# Patient Record
Sex: MALE | Race: WHITE | NOT HISPANIC OR LATINO
[De-identification: names, ages, dates, MRNs, and addresses within clinical notes are randomized per-mention and may not be internally consistent; named-entity substitution may affect disease eponyms.]

---

## 2019-03-07 PROBLEM — Z00.00 ENCOUNTER FOR PREVENTIVE HEALTH EXAMINATION: Status: ACTIVE | Noted: 2019-03-07

## 2020-02-25 ENCOUNTER — RX RENEWAL (OUTPATIENT)
Age: 50
End: 2020-02-25

## 2020-02-25 RX ORDER — BUDESONIDE 0.5 MG/2ML
0.5 INHALANT ORAL
Qty: 120 | Refills: 5 | Status: ACTIVE | COMMUNITY
Start: 2020-02-25 | End: 1900-01-01

## 2020-11-06 ENCOUNTER — APPOINTMENT (OUTPATIENT)
Dept: OTOLARYNGOLOGY | Facility: CLINIC | Age: 50
End: 2020-11-06
Payer: COMMERCIAL

## 2020-11-06 VITALS — WEIGHT: 195 LBS | TEMPERATURE: 98 F | HEIGHT: 72 IN | BODY MASS INDEX: 26.41 KG/M2

## 2020-11-06 DIAGNOSIS — Z80.9 FAMILY HISTORY OF MALIGNANT NEOPLASM, UNSPECIFIED: ICD-10-CM

## 2020-11-06 DIAGNOSIS — Z78.9 OTHER SPECIFIED HEALTH STATUS: ICD-10-CM

## 2020-11-06 DIAGNOSIS — Z86.69 PERSONAL HISTORY OF OTHER DISEASES OF THE NERVOUS SYSTEM AND SENSE ORGANS: ICD-10-CM

## 2020-11-06 DIAGNOSIS — Z87.09 PERSONAL HISTORY OF OTHER DISEASES OF THE RESPIRATORY SYSTEM: ICD-10-CM

## 2020-11-06 DIAGNOSIS — Z82.49 FAMILY HISTORY OF ISCHEMIC HEART DISEASE AND OTHER DISEASES OF THE CIRCULATORY SYSTEM: ICD-10-CM

## 2020-11-06 DIAGNOSIS — G47.9 SLEEP DISORDER, UNSPECIFIED: ICD-10-CM

## 2020-11-06 DIAGNOSIS — Z82.3 FAMILY HISTORY OF STROKE: ICD-10-CM

## 2020-11-06 DIAGNOSIS — J31.0 CHRONIC RHINITIS: ICD-10-CM

## 2020-11-06 PROCEDURE — 99214 OFFICE O/P EST MOD 30 MIN: CPT | Mod: 25

## 2020-11-06 PROCEDURE — 31231 NASAL ENDOSCOPY DX: CPT

## 2020-11-06 PROCEDURE — 99072 ADDL SUPL MATRL&STAF TM PHE: CPT

## 2020-11-12 LAB — BACTERIA FLD CULT: NORMAL

## 2020-12-10 NOTE — PHYSICAL EXAM
[FreeTextEntry1] : The patient was alert and oriented and in no distress.\par Voice was clear.\par \par Face:\par The patient had no facial asymmetry or mass.\par The skin was unremarkable.\par \par Eyes:\par The pupils were equal round and reactive to light and accommodation.\par There was no significant nystagmus or disconjugate gaze noted.\par \par Nose: \par The external nose had no significant deformity.  There was no facial tenderness.  On anterior rhinoscopy, the nasal mucosa was clear.  The anterior septum was midline.  There were no visualized polyps purulence  or masses.\par \par Oral cavity:\par The oral mucosa was normal.\par The oral and base of tongue were clear and without mass.\par The gingival and buccal mucosa were moist and without lesions.\par The palate moved well.\par There was no cleft to the palate.\par There appeared to be good salivary flow.  \par There was no pus, erythema or mass in the oral cavity.\par \par He is status post UPPP\par \par Ears:\par The external ears were normal without deformity.\par The ear canals were clear.\par The tympanic membranes were intact and normal.\par \par Neck: \par The neck was symmetrical.\par The parotid and submandibular glands were normal without masses.\par The trachea was midline and there was no unusual crepitus.\par The thyroid was smooth and nontender and no masses were palpated.\par There was no significant cervical adenopathy.\par \par \par Neuro:\par Neurologically, the patient was awake, alert, and oriented to person, place and time. There were no obvious focal neurologic abnormalities.  Cranial nerves II through XII were grossly intact.\par \par \par TMJ:\par The temporomandibular joints were nontender.\par There was no abnormal crepitus and no significant malocclusion\par  [de-identified] : \par Nasal endoscopy: \par CPT 68584\par Procedure Note:\par \par Endoscopy was done with Covid precautions and with video. All risks and benefits were discussed with the patient and consent obtained.\par \par Nasal endoscopy was done with topical anesthesia of Pontocaine and Afrin and a      nasal endoscope.\par Indication: Nasal congestion, rule out sinusitis.\par Procedure: The nasal cavity was anesthetized with topical Afrin and Pontocaine. An  endoscope was used and inserted into the nasal cavity.\par Attention was first paid to the anterior nasal cavity.\par Endocoscopy was performed to inspect the interior of the nasal cavity, the nasal septum,  the middle and superior meati, the inferior, middle and superior turbinates, and the spheno-ethmoidal  recesses, the nasopharynx and eustachian tube orifices bilaterally. \par All findings were normal except:\par \par Nasal mucosa was beefy throughout. The left antrostomy appeared patent and there was some thickening of the mucosa in the ethmoid labyrinth. A culture was taken endoscopically\par On the right side there was lateralization of the right middle turbinate narrowing the drainage and without purulence\par

## 2020-12-10 NOTE — REVIEW OF SYSTEMS
[Nasal Congestion] : nasal congestion [Throat Clearing] : throat clearing [Throat Pain] : throat pain [Negative] : Heme/Lymph [de-identified] : obstruction, post nasal drip, nose bleeding, watery eyes

## 2020-12-10 NOTE — CONSULT LETTER
[FreeTextEntry2] : JAKOB ELLISON\par  [FreeTextEntry1] : \par \par Dear  Dr. JAKOB ELLISON,\par \par I had the pleasure of seeing your patient today.  \par Please see my note below.\par \par \par Thank you very much for allowing me to participate in the care of your patient.\par \par Sincerely,\par \par \par  [FreeTextEntry3] : Braulio\par \par Jhonathan Salgado MD\par [facility]\par [facility]\par  [facility]\par \par

## 2020-12-10 NOTE — ASSESSMENT
[FreeTextEntry1] : It was my impression that the patient has a chronic rhinitis and chronic sinusitis, cephalgia and postnasal drip. This is in spite of desensitization and multiple medical therapies. I did not see any purulence but she just finished 3 weeks of doxycycline. In the past, Restech pH testing showed no significant reflux. I was not certain that I would keep him on PPI therapy and this was discussed.  I suggested a trial of Dymista as he has found budesonide rinse did not help him nor did Flonase alone and he has enviromental allergies.\par I would treat further if indicated by his culture but it did not recommend an antibiotic at this time.  He had just finished 3 weeks of doxycycline for Lyme.\par I discussed the use of nasal steroid eluding stents, ilk antogonists such as dupixent and further surgical intervention if indicated. 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

## 2020-12-10 NOTE — HISTORY OF PRESENT ILLNESS
[de-identified] : DANIAL TRIPP Was seen on November 6. I had last seen him for less than 2 years ago. He has a history of allergy and sinusitis with surgery that was apparently incomplete because the surgeon had an orbital penetration. When I saw him previously we did Restech testing which was quite negative. He has since undergone desensitization without much improvement. He complains of a postnasal drip and mucus that is it that at times green as well as a sore throats. He is status post UPPP.  His symptoms seem to be worse on the left side than the right .  He is using Nexium bid. He had just finished 3 weeks of doxycycline for Lyme disease and did not find much improvement in his sinuses.The patient had no other ear nose or throat complaints at this visit.

## 2020-12-10 NOTE — ADDENDUM
[FreeTextEntry1] : 11/12/20  culture normal roslyn after 3 weeks doxy.  Would not add abx.  cc MD.  \par \par 12/10 2020  CT films and report reviewed.  Has inferior orbital dehiscence, but o bulging or apparent periorbital herniation/\par has significant nasal valve collapse on scan. \par Large right sided retention cyst  ? odontogenic.\par Small right ethmoidal obstruction with partial opacification and osteitis- consider sinuplasty..\par  No sigifnicant change from previous scan.  RLP

## 2021-07-06 ENCOUNTER — RX RENEWAL (OUTPATIENT)
Age: 51
End: 2021-07-06

## 2022-05-22 ENCOUNTER — RX RENEWAL (OUTPATIENT)
Age: 52
End: 2022-05-22

## 2022-11-07 ENCOUNTER — RX RENEWAL (OUTPATIENT)
Age: 52
End: 2022-11-07

## 2023-08-01 ENCOUNTER — RX RENEWAL (OUTPATIENT)
Age: 53
End: 2023-08-01

## 2023-08-01 RX ORDER — AZELASTINE HYDROCHLORIDE AND FLUTICASONE PROPIONATE 137; 50 UG/1; UG/1
137-50 SPRAY, METERED NASAL
Qty: 3 | Refills: 3 | Status: ACTIVE | COMMUNITY
Start: 2020-11-06 | End: 1900-01-01

## 2023-11-29 ENCOUNTER — APPOINTMENT (OUTPATIENT)
Dept: OTOLARYNGOLOGY | Facility: CLINIC | Age: 53
End: 2023-11-29
Payer: COMMERCIAL

## 2023-11-29 DIAGNOSIS — Z78.9 OTHER SPECIFIED HEALTH STATUS: ICD-10-CM

## 2023-11-29 PROCEDURE — 99203 OFFICE O/P NEW LOW 30 MIN: CPT | Mod: 25

## 2023-11-29 PROCEDURE — 31231 NASAL ENDOSCOPY DX: CPT

## 2023-11-29 RX ORDER — BECLOMETHASONE DIPROPIONATE HFA 80 UG/1
AEROSOL, METERED RESPIRATORY (INHALATION)
Refills: 0 | Status: ACTIVE | COMMUNITY

## 2023-11-29 RX ORDER — FAMOTIDINE 10 MG/1
TABLET, FILM COATED ORAL
Refills: 0 | Status: ACTIVE | COMMUNITY

## 2023-11-29 RX ORDER — DOXYCYCLINE HYCLATE 100 MG/1
100 CAPSULE ORAL
Qty: 20 | Refills: 0 | Status: ACTIVE | COMMUNITY
Start: 2023-11-29 | End: 1900-01-01

## 2023-11-29 RX ORDER — ALBUTEROL 90 MCG
AEROSOL (GRAM) INHALATION
Refills: 0 | Status: ACTIVE | COMMUNITY

## 2023-11-29 RX ORDER — ESOMEPRAZOLE MAGNESIUM 20 MG/1
20 CAPSULE, DELAYED RELEASE ORAL
Refills: 0 | Status: ACTIVE | COMMUNITY

## 2023-12-12 LAB — EAR NOSE AND THROAT CULTURE: NORMAL

## 2023-12-13 ENCOUNTER — APPOINTMENT (OUTPATIENT)
Dept: OTOLARYNGOLOGY | Facility: CLINIC | Age: 53
End: 2023-12-13
Payer: COMMERCIAL

## 2023-12-13 VITALS — WEIGHT: 195 LBS | BODY MASS INDEX: 26.41 KG/M2 | HEIGHT: 72 IN

## 2023-12-13 DIAGNOSIS — K21.9 GASTRO-ESOPHAGEAL REFLUX DISEASE W/OUT ESOPHAGITIS: ICD-10-CM

## 2023-12-13 DIAGNOSIS — J32.9 CHRONIC SINUSITIS, UNSPECIFIED: ICD-10-CM

## 2023-12-13 PROCEDURE — 31231 NASAL ENDOSCOPY DX: CPT

## 2023-12-13 PROCEDURE — 99213 OFFICE O/P EST LOW 20 MIN: CPT | Mod: 25

## 2023-12-13 RX ORDER — PREDNISONE 10 MG/1
10 TABLET ORAL
Qty: 12 | Refills: 0 | Status: ACTIVE | COMMUNITY
Start: 2023-12-13 | End: 1900-01-01

## 2023-12-13 NOTE — REVIEW OF SYSTEMS
[Post Nasal Drip] : post nasal drip [Discolored Nasal Discharge] : discolored nasal discharge [Negative] : Heme/Lymph [Patient Intake Form Reviewed] : Patient intake form was reviewed [de-identified] : cough

## 2023-12-13 NOTE — ASSESSMENT
[FreeTextEntry1] : DANIAL TRIPP is feeling somewhat better. I recultured his nose. He has mild asthma.He will use prednisone 30mg taper. I discussed the risks of taking steroid medication including the risk of, osteoporosis and bone, fracture, GI problems, cataracts and mood changes. The patient indicated to me that he understands the risks.

## 2023-12-13 NOTE — HISTORY OF PRESENT ILLNESS
[de-identified] : DANIAL TRIPP  is a 52 year man with a history of CRS and Asthma. Doxy helped but he is still symptomatic. Culture wasnt helpful.

## 2023-12-13 NOTE — PHYSICAL EXAM
[TextEntry] : PHYSICAL EXAM  Procedure: Fiberoptic Nasal Endoscopy  Diagnosis: Chronic sinusitis; s/p sinus surgery Examiner: Dr. Erasto Godoy Anesthesia: Topical Lidocaine 2% and oxymetazoline  Procedure: The fiberoptic endoscope was introduced into the right nostril and passed along the floor of the nose and then  along the middle meatus. The same procedure was carried out  on the left side.  Findings:  SEPTUM:    RIGHT:  s/p sinus surgery Middle Turbinate: normal Frontal Sinus/Recess:clear Ethmoid Sinus cavity:clear Maxillary Sinus antrostomy:open Interior of maxillary of sinus:normal Spheno ethmoidal recess:clear Inferior turbinate normal  LEFT: s/p surgery Middle Turbinate: normal Frontal Sinus/Recess:clear Ethmoid Sinus cavity:clear Maxillary Sinus antrostomy:open Interior of maxillary of sinus:normal Spheno ethmoidal recess:clear Inferior turbinate normal  cultured

## 2023-12-21 LAB — EAR NOSE AND THROAT CULTURE: NORMAL

## 2023-12-22 DIAGNOSIS — R05.9 COUGH, UNSPECIFIED: ICD-10-CM

## 2023-12-22 RX ORDER — SULFAMETHOXAZOLE AND TRIMETHOPRIM 800; 160 MG/1; MG/1
800-160 TABLET ORAL TWICE DAILY
Qty: 20 | Refills: 0 | Status: ACTIVE | COMMUNITY
Start: 2023-12-22 | End: 1900-01-01